# Patient Record
Sex: MALE | Race: WHITE | NOT HISPANIC OR LATINO | Employment: OTHER | URBAN - METROPOLITAN AREA
[De-identification: names, ages, dates, MRNs, and addresses within clinical notes are randomized per-mention and may not be internally consistent; named-entity substitution may affect disease eponyms.]

---

## 2019-03-17 ENCOUNTER — APPOINTMENT (OUTPATIENT)
Dept: GENERAL RADIOLOGY | Facility: HOSPITAL | Age: 74
End: 2019-03-17

## 2019-03-17 ENCOUNTER — HOSPITAL ENCOUNTER (EMERGENCY)
Facility: HOSPITAL | Age: 74
Discharge: HOME | End: 2019-03-17
Attending: EMERGENCY MEDICINE | Admitting: EMERGENCY MEDICINE

## 2019-03-17 VITALS
BODY MASS INDEX: 29.12 KG/M2 | RESPIRATION RATE: 18 BRPM | WEIGHT: 215 LBS | TEMPERATURE: 97 F | OXYGEN SATURATION: 97 % | DIASTOLIC BLOOD PRESSURE: 81 MMHG | SYSTOLIC BLOOD PRESSURE: 146 MMHG | HEIGHT: 72 IN | HEART RATE: 95 BPM

## 2019-03-17 DIAGNOSIS — E11.628 DIABETIC FOOT INFECTION (HCC): Primary | ICD-10-CM

## 2019-03-17 DIAGNOSIS — L08.9 DIABETIC FOOT INFECTION (HCC): Primary | ICD-10-CM

## 2019-03-17 PROCEDURE — 99283 EMERGENCY DEPT VISIT LOW MDM: CPT

## 2019-03-17 PROCEDURE — 73630 X-RAY EXAM OF FOOT: CPT

## 2019-03-17 RX ORDER — AMOXICILLIN AND CLAVULANATE POTASSIUM 875; 125 MG/1; MG/1
1 TABLET, FILM COATED ORAL EVERY 12 HOURS
Qty: 14 TABLET | Refills: 0 | Status: SHIPPED | OUTPATIENT
Start: 2019-03-17 | End: 2019-03-24

## 2019-03-17 RX ORDER — IBUPROFEN 800 MG/1
800 TABLET ORAL ONCE
Status: DISCONTINUED | OUTPATIENT
Start: 2019-03-17 | End: 2019-03-18 | Stop reason: HOSPADM

## 2019-03-17 RX ORDER — HYDROCODONE BITARTRATE AND ACETAMINOPHEN 5; 325 MG/1; MG/1
1 TABLET ORAL EVERY 8 HOURS PRN
Qty: 15 TABLET | Refills: 0 | Status: SHIPPED | OUTPATIENT
Start: 2019-03-17 | End: 2019-04-15

## 2019-03-17 RX ORDER — GLIMEPIRIDE 4 MG/1
2 TABLET ORAL
COMMUNITY

## 2019-03-17 RX ORDER — BUSPIRONE HYDROCHLORIDE 10 MG/1
10 TABLET ORAL 3 TIMES DAILY
COMMUNITY

## 2019-03-18 NOTE — ED NOTES
Pt reports taking Neurontin and hydrocodone prior to arrival with no relief.      Donny, NELY Corcoran  03/17/19 2050

## 2019-03-18 NOTE — DISCHARGE INSTRUCTIONS
Take Tylenol or Motrin for pain.  Use Lortab for breakthrough pain.  Keep foot elevated.  Wear a shoe that is very wide and does not put pressure on your foot.  No prolonged sitting or standing.

## 2019-03-18 NOTE — ED TRIAGE NOTES
Pt c/o R big toe pain x3 days, states started as redness before pain started. Pt states he has abscess that has no drainage, denies fever. Pt has hx of Type 2 DM.

## 2019-03-18 NOTE — ED PROVIDER NOTES
" EMERGENCY DEPARTMENT ENCOUNTER    CHIEF COMPLAINT  Chief Complaint: toe pain  History given by: patient  History limited by: none  Room Number: 01/01  PMD: Marcio Bowie MD      HPI:  Pt is a 73 y.o. male with Hx of DM, who presents complaining of \"shooting\" toe pain from the R great toe that began around 1200. About 3-4 days ago the pt began to develop redness and swelling on his R great toe. Pt states that he has not had any pain in his toe until earlier this morning. He states that earlier today he wore dress shoes that were rubbing on the area. Pt denies previous Hx of diabetic foot infections, Hx of gout, and any diabetic neuropathy. Pt took a 5 mg hydrocodone at 1600 and a gabapentin around 1700 that has not provided any relief. Pt states he has no prior Hx of similar pain. Pt denies fever, SOA, CP, numbness, tingling, injury, or any other complaints.    Duration:  About 9 hours  Onset: gradual  Timing: constant  Location: R great toe  Radiation: none  Quality: shooting  Intensity/Severity: moderate  Progression: worsened  Associated Symptoms: none  Aggravating Factors: touch  Alleviating Factors: none  Previous Episodes: none  Treatment before arrival: none    PAST MEDICAL HISTORY  Active Ambulatory Problems     Diagnosis Date Noted   • No Active Ambulatory Problems     Resolved Ambulatory Problems     Diagnosis Date Noted   • No Resolved Ambulatory Problems     Past Medical History:   Diagnosis Date   • Anxiety    • Diabetes mellitus (CMS/HCC)    • Hypertension        PAST SURGICAL HISTORY  Past Surgical History:   Procedure Laterality Date   • ABDOMINAL HERNIA REPAIR     • APPENDECTOMY         FAMILY HISTORY  History reviewed. No pertinent family history.    SOCIAL HISTORY  Social History     Socioeconomic History   • Marital status:      Spouse name: Not on file   • Number of children: Not on file   • Years of education: Not on file   • Highest education level: Not on file   Social Needs   • " Financial resource strain: Not on file   • Food insecurity - worry: Not on file   • Food insecurity - inability: Not on file   • Transportation needs - medical: Not on file   • Transportation needs - non-medical: Not on file   Occupational History   • Not on file   Tobacco Use   • Smoking status: Never Smoker   • Smokeless tobacco: Never Used   Substance and Sexual Activity   • Alcohol use: No     Frequency: Never   • Drug use: No   • Sexual activity: Not on file   Other Topics Concern   • Not on file   Social History Narrative   • Not on file       ALLERGIES  Patient has no known allergies.    REVIEW OF SYSTEMS  Review of Systems   Constitutional: Negative for activity change, appetite change and fever.   HENT: Negative for congestion and sore throat.    Eyes: Negative.    Respiratory: Negative for cough and shortness of breath.    Cardiovascular: Negative for chest pain and leg swelling.   Gastrointestinal: Negative for abdominal pain, diarrhea and vomiting.   Endocrine: Negative.    Genitourinary: Negative for decreased urine volume and dysuria.   Musculoskeletal: Negative for neck pain.        (+) R great pain   Skin: Negative for rash and wound.   Allergic/Immunologic: Negative.    Neurological: Negative for weakness, numbness and headaches.   Hematological: Negative.    Psychiatric/Behavioral: Negative.    All other systems reviewed and are negative.      PHYSICAL EXAM  ED Triage Vitals   Temp Heart Rate Resp BP SpO2   03/17/19 2002 03/17/19 2002 03/17/19 2002 03/17/19 2028 03/17/19 2002   96.7 °F (35.9 °C) 95 20 146/81 97 %      Temp src Heart Rate Source Patient Position BP Location FiO2 (%)   03/17/19 2002 03/17/19 2002 -- -- --   Tympanic Monitor          Physical Exam   Constitutional: He is oriented to person, place, and time. No distress.   HENT:   Head: Normocephalic and atraumatic.   Eyes: EOM are normal. Pupils are equal, round, and reactive to light.   Neck: Normal range of motion. Neck supple.    Cardiovascular: Normal rate, regular rhythm and normal heart sounds.   Pulmonary/Chest: Effort normal and breath sounds normal. No respiratory distress.   Abdominal: Soft. There is no tenderness. There is no rebound and no guarding.   Musculoskeletal: Normal range of motion. He exhibits no edema.        Right foot: There is tenderness (R great toe).   Pt has bilateral bunions. On the L foot there is very mild erythema at the bunion, it is about 1 cm in diameter. On the pt's R foot there is erythema that is about 2 cm in diameter around the bunion. To the R foot there is some mild breakdown, but no purulent drainage. There is chronic onychomycosis and onycholysis to all of his toes.   Neurological: He is alert and oriented to person, place, and time. He has normal sensation and normal strength.   Skin: Skin is warm and dry.   Psychiatric: Mood and affect normal.   Nursing note and vitals reviewed.      RADIOLOGY  XR Foot 3+ View Right   Final Result   1. No acute osseous abnormality.       This report was finalized on 3/17/2019 8:46 PM by Saulo Francois M.D.             Interpreted by radiologist. Reviewed by me in PACS.     PROCEDURES  Procedures      PROGRESS AND CONSULTS     2104- After the PEx and Hx, I informed the pt of his negative imaging results. Discussed the plan to d/c the pt home with Rx for Augmentin and Norco. Advised pt to f/u with an orthopedist. Further advised the pt to use wide-soled shoes and to use Lortab for break-through pain. RTED instructions given. Pt understands and agrees with the plan, all questions answered.    2155- Ordered Ibuprofen for pt pain.    MEDICAL DECISION MAKING  Results were reviewed/discussed with the patient and they were also made aware of online access. Pt also made aware that some labs, such as cultures, will not be resulted during ER visit and follow up with PMD is necessary.     MDM  Number of Diagnoses or Management Options     Amount and/or Complexity of Data  Reviewed  Tests in the radiology section of CPT®: reviewed (XR R foot- negative)    Patient Progress  Patient progress: stable         DIAGNOSIS  Final diagnoses:   Mild, early diabetic foot infection (CMS/HCC)       DISPOSITION  DISCHARGE    Patient discharged in stable condition.    Reviewed implications of results, diagnosis, meds, responsibility to follow up, warning signs and symptoms of possible worsening, potential complications and reasons to return to ER.    Patient/Family voiced understanding of above instructions.    Discussed plan for discharge, as there is no emergent indication for admission. Patient referred to primary care provider for BP management due to today's BP. Pt/family is agreeable and understands need for follow up and repeat testing.  Pt is aware that discharge does not mean that nothing is wrong but it indicates no emergency is present that requires admission and they must continue care with follow-up as given below or physician of their choice.     FOLLOW-UP  Marcio Bowie MD  [address] [facility] [place] [id number] [phone]    In 3 days  Return if pain worsens, If symptoms worsen, shortness of breath, fever, any concerns    Marcio Jimenez MD  [address] [id number] [phone]      call in the am for follow up this week., Return if pain worsens, If symptoms worsen, shortness of breath, fever, any concerns         Medication List      New Prescriptions    amoxicillin-clavulanate 875-125 MG per tablet  Commonly known as:  AUGMENTIN  Take 1 tablet by mouth Every 12 (Twelve) Hours for 7 days.     HYDROcodone-acetaminophen 5-325 MG per tablet  Commonly known as:  NORCO  Take 1 tablet by mouth Every 8 (Eight) Hours As Needed for Moderate Pain    for up to 15 doses.              Latest Documented Vital Signs:  As of 9:53 PM  BP- 146/81 HR- 95 Temp- 96.7 °F (35.9 °C) (Tympanic) O2 sat- 97%    --  Documentation assistance provided by elsa  Alessio Page for Dr. Anaya.  Information recorded by the scribe was done at my direction and has been verified and validated by me.     Alessio Page  03/17/19 6465       Toñito Anaya MD  03/17/19 1526

## 2019-04-15 ENCOUNTER — OFFICE VISIT (OUTPATIENT)
Dept: ORTHOPEDIC SURGERY | Facility: CLINIC | Age: 74
End: 2019-04-15

## 2019-04-15 VITALS — TEMPERATURE: 98.2 F | BODY MASS INDEX: 28.99 KG/M2 | WEIGHT: 214 LBS | HEIGHT: 72 IN

## 2019-04-15 DIAGNOSIS — M21.621 BUNIONETTE OF RIGHT FOOT: ICD-10-CM

## 2019-04-15 DIAGNOSIS — M20.41 HAMMER TOE OF RIGHT FOOT: ICD-10-CM

## 2019-04-15 DIAGNOSIS — M19.071 ARTHRITIS OF FIRST METATARSOPHALANGEAL (MTP) JOINT OF RIGHT FOOT: Primary | ICD-10-CM

## 2019-04-15 PROCEDURE — 99204 OFFICE O/P NEW MOD 45 MIN: CPT | Performed by: ORTHOPAEDIC SURGERY

## 2019-04-15 RX ORDER — POTASSIUM CHLORIDE 750 MG/1
10 TABLET, FILM COATED, EXTENDED RELEASE ORAL 2 TIMES DAILY
COMMUNITY

## 2019-04-15 NOTE — PROGRESS NOTES
"   New Patient Complaint      Patient: Guy Spann  YOB: 1945 73 y.o. male  Medical Record Number: [id number]    Chief Complaints: My foot was hurting    History of Present Illness:      Patient was seen in the emergency room on 3/17/2019 when he had had onset of severe intermittent aching burning pain with redness and swelling mainly around his first MTP joint more so than his fifth toe.  He has a history of diabetes and symptoms have been improved with use of antibiotics anti-inflammatories and more accommodative shoes.    Symptoms began after he had worn some tight dress shoes and had rubbed his great toe and may be \"skinned it\".  His symptoms are markedly improved now with really no significant complaints of pain in the foot.  He had some shooting pain.    HPI    Allergies: No Known Allergies    Medications:   Current Outpatient Medications on File Prior to Visit   Medication Sig   • busPIRone (BUSPAR) 10 MG tablet Take 10 mg by mouth 3 (Three) Times a Day.   • glimepiride (AMARYL) 4 MG tablet Take 2 mg by mouth Every Morning Before Breakfast.   • metFORMIN (GLUCOPHAGE) 850 MG tablet Take 850 mg by mouth 3 (Three) Times a Day.   • potassium chloride (K-DUR) 10 MEQ CR tablet Take 10 mEq by mouth 2 (Two) Times a Day.   • [DISCONTINUED] HYDROcodone-acetaminophen (NORCO) 5-325 MG per tablet Take 1 tablet by mouth Every 8 (Eight) Hours As Needed for Moderate Pain  for up to 15 doses.     No current facility-administered medications on file prior to visit.        Past Medical History:   Diagnosis Date   • Anxiety    • Diabetes mellitus (CMS/HCC)    • Hypertension      Past Surgical History:   Procedure Laterality Date   • ABDOMINAL HERNIA REPAIR     • APPENDECTOMY       Social History     Occupational History   • Not on file   Tobacco Use   • Smoking status: Never Smoker   • Smokeless tobacco: Never Used   Substance and Sexual Activity   • Alcohol use: No     Frequency: Never   • Drug use: No   • Sexual " "activity: Not on file      Social History     Social History Narrative   • Not on file     History reviewed. No pertinent family history.    Review of Systems: 14 point review of systems performed, positive pertinent findings identified in HPI. All remaining systems negative except numbness and tingling in hayfever    Review of Systems      Physical Exam:   Vitals:    04/15/19 1314   Temp: 98.2 °F (36.8 °C)   Weight: 97.1 kg (214 lb)   Height: 182.9 cm (72\")     Physical Exam   Constitutional: pleasant, well developed   Eyes: sclera non icteric  Hearing : adequate for exam  Cardiovascular: palpable pulses in bilaeral feet, bilateral calves/ thighs NT without sign of DVT  Respiratoy: breathing unlabored   Neurological: grossly sensate to LT throughout bilateral LEs  Psychiatric: oriented with normal mood and affect.   Lymphatic: No palpable popliteal lymphadenopathy bilateral LEs  Skin: intact throughout bilateral legs/feet  Musculoskeletal: Right foot shows moderate bony prominence over the dorsum of the first MTP joint but no overlying warmth erythema.  He has 40 degrees dorsiflexion 20 degrees plantarflexion without pain.  There is cockup deformity of the fifth toe but no warmth erythema there is some mild prominence of the lateral aspect of the fifth toe but no clear bursal formation no warmth or erythema.  Neutral dorsiflexion of the heel cord  Physical Exam  Ortho Exam    Radiology: 3 views of the right foot reviewed on the [employer] system from 3/17/2019.  No obvious fracture.  There is significant arthritic change especially around the first MTP joint with spurring dorsally there is some spurring at the insertion of the Achilles as well as plantarly as well at the calcaneus.  There is some divergence of the fourth and fifth metatarsal's with an angle of around 10.5 with some bony prominence laterally.    Assessment/Plan: 1.  Exacerbation of chronic right first MTP arthritis with dorsal exostosis with possible " "resolved cellulitis  2.  Right fifth hammertoe  3.  Right fifth bunionette with transient bursitis    Overall his symptoms have markedly improved he is been off antibiotics for quite some time now I do not see any residual sign of infection.    We discussed operative and nonoperative treatment options and is not want to do anything from an operative standpoint.  He would need at the very least a cheilectomy of the right first toe and other treatment options could include fifth bunionette correction and fifth hammertoe.  Overall symptoms not bothersome enough to him for this and he does well as long as he wears wide extra-depth \"diabetic shoes\".  Recommend that he do this and demonstrated heel cord stretching exercises for him to do at least 4 times a day.  Instruction sheet provided and demonstrated for him discussed etiology of heel cord tightness to forefoot overload.    Anything worsens let me know otherwise I will see him back as needed  "

## 2021-12-04 ENCOUNTER — IMMUNIZATION (OUTPATIENT)
Dept: VACCINE CLINIC | Facility: HOSPITAL | Age: 76
End: 2021-12-04

## 2021-12-04 PROCEDURE — 0004A HC ADM SARSCOV2 30MCG/0.3ML BOOSTER: CPT | Performed by: INTERNAL MEDICINE

## 2021-12-04 PROCEDURE — 91300 HC SARSCOV02 VAC 30MCG/0.3ML IM: CPT | Performed by: INTERNAL MEDICINE

## 2022-03-17 ENCOUNTER — TELEPHONE (OUTPATIENT)
Dept: FAMILY MEDICINE CLINIC | Facility: CLINIC | Age: 77
End: 2022-03-17

## 2022-03-17 NOTE — TELEPHONE ENCOUNTER
OKAY FOR HUB TO READ    Please reschedule 4/5/22 appt. Dr. Fraser will not be at the [facility] office that day. He will be at the [facility] office.

## 2023-02-23 ENCOUNTER — LAB REQUISITION (OUTPATIENT)
Dept: LAB | Facility: HOSPITAL | Age: 78
End: 2023-02-23
Payer: MEDICARE

## 2023-02-23 ENCOUNTER — TRANSCRIBE ORDERS (OUTPATIENT)
Dept: ADMINISTRATIVE | Facility: HOSPITAL | Age: 78
End: 2023-02-23
Payer: MEDICARE

## 2023-02-23 ENCOUNTER — HOSPITAL ENCOUNTER (OUTPATIENT)
Dept: GENERAL RADIOLOGY | Facility: HOSPITAL | Age: 78
Discharge: HOME | End: 2023-02-23
Admitting: SURGERY
Payer: MEDICARE

## 2023-02-23 DIAGNOSIS — L97.509 DIABETIC FOOT ULCER ASSOCIATED WITH OTHER SPECIFIED DIABETES MELLITUS, UNSPECIFIED LATERALITY, UNSPECIFIED PART OF FOOT, UNSPECIFIED ULCER STAGE: ICD-10-CM

## 2023-02-23 DIAGNOSIS — E13.621 DIABETIC FOOT ULCER ASSOCIATED WITH OTHER SPECIFIED DIABETES MELLITUS, UNSPECIFIED LATERALITY, UNSPECIFIED PART OF FOOT, UNSPECIFIED ULCER STAGE: ICD-10-CM

## 2023-02-23 DIAGNOSIS — L97.521 DIABETIC ULCER OF LEFT FOOT ASSOCIATED WITH DIABETES MELLITUS DUE TO UNDERLYING CONDITION, LIMITED TO BREAKDOWN OF SKIN, UNSPECIFIED PART OF FOOT: Primary | ICD-10-CM

## 2023-02-23 DIAGNOSIS — L97.529 NON-PRESSURE CHRONIC ULCER OF OTHER PART OF LEFT FOOT WITH UNSPECIFIED SEVERITY: ICD-10-CM

## 2023-02-23 DIAGNOSIS — E08.621 DIABETIC ULCER OF LEFT FOOT ASSOCIATED WITH DIABETES MELLITUS DUE TO UNDERLYING CONDITION, LIMITED TO BREAKDOWN OF SKIN, UNSPECIFIED PART OF FOOT: Primary | ICD-10-CM

## 2023-02-23 PROCEDURE — 87147 CULTURE TYPE IMMUNOLOGIC: CPT | Performed by: SURGERY

## 2023-02-23 PROCEDURE — 87186 SC STD MICRODIL/AGAR DIL: CPT | Performed by: SURGERY

## 2023-02-23 PROCEDURE — 87205 SMEAR GRAM STAIN: CPT | Performed by: SURGERY

## 2023-02-23 PROCEDURE — 87070 CULTURE OTHR SPECIMN AEROBIC: CPT | Performed by: SURGERY

## 2023-02-23 PROCEDURE — 73630 X-RAY EXAM OF FOOT: CPT

## 2023-02-26 LAB
BACTERIA SPEC AEROBE CULT: ABNORMAL
GRAM STN SPEC: ABNORMAL
GRAM STN SPEC: ABNORMAL

## 2023-03-02 RX ORDER — DOXYCYCLINE HYCLATE 100 MG/1
100 CAPSULE ORAL 2 TIMES DAILY
Qty: 20 CAPSULE | Refills: 1 | Status: SHIPPED | OUTPATIENT
Start: 2023-03-02

## 2023-03-08 ENCOUNTER — OFFICE VISIT (OUTPATIENT)
Dept: WOUND CARE | Facility: HOSPITAL | Age: 78
End: 2023-03-08
Payer: MEDICARE

## 2023-03-08 PROCEDURE — 97602 WOUND(S) CARE NON-SELECTIVE: CPT

## 2023-03-08 PROCEDURE — G0463 HOSPITAL OUTPT CLINIC VISIT: HCPCS

## 2023-03-15 ENCOUNTER — OFFICE VISIT (OUTPATIENT)
Dept: WOUND CARE | Facility: HOSPITAL | Age: 78
End: 2023-03-15
Payer: MEDICARE

## 2023-03-15 PROCEDURE — G0463 HOSPITAL OUTPT CLINIC VISIT: HCPCS

## 2023-03-28 ENCOUNTER — OFFICE VISIT (OUTPATIENT)
Dept: WOUND CARE | Facility: HOSPITAL | Age: 78
End: 2023-03-28
Payer: MEDICARE

## 2023-03-28 PROCEDURE — G0463 HOSPITAL OUTPT CLINIC VISIT: HCPCS

## 2023-04-18 ENCOUNTER — OFFICE VISIT (OUTPATIENT)
Dept: WOUND CARE | Facility: HOSPITAL | Age: 78
End: 2023-04-18
Payer: MEDICARE

## 2023-04-18 PROCEDURE — G0463 HOSPITAL OUTPT CLINIC VISIT: HCPCS

## 2023-04-27 ENCOUNTER — HOSPITAL ENCOUNTER (OUTPATIENT)
Dept: GENERAL RADIOLOGY | Facility: HOSPITAL | Age: 78
Discharge: HOME | End: 2023-04-27
Payer: MEDICARE

## 2023-04-27 DIAGNOSIS — L97.529 ULCER OF LEFT FOOT, UNSPECIFIED ULCER STAGE: ICD-10-CM

## 2023-04-27 PROCEDURE — 73630 X-RAY EXAM OF FOOT: CPT
